# Patient Record
Sex: FEMALE | Race: BLACK OR AFRICAN AMERICAN | URBAN - NONMETROPOLITAN AREA
[De-identification: names, ages, dates, MRNs, and addresses within clinical notes are randomized per-mention and may not be internally consistent; named-entity substitution may affect disease eponyms.]

---

## 2019-10-12 ENCOUNTER — IMPORTED ENCOUNTER (OUTPATIENT)
Dept: URBAN - NONMETROPOLITAN AREA CLINIC 1 | Facility: CLINIC | Age: 39
End: 2019-10-12

## 2019-10-12 PROBLEM — H52.12: Noted: 2019-10-12

## 2019-10-12 PROBLEM — H52.01: Noted: 2019-10-12

## 2019-10-12 PROCEDURE — S0620 ROUTINE OPHTHALMOLOGICAL EXA: HCPCS

## 2019-10-12 NOTE — PATIENT DISCUSSION
"Hyperopia OD Myopia OSDiscussed refractive status in detail with patient. New glasses Rx given today. Continue to monitor. Visual Distortion OSDiscussed diagnosis in detail with patient. No OCT available in satellite location. Patient reports ""bent"" light beam on [facility] Rx test.Refer to [facility] for further evaluation. "

## 2022-04-09 ASSESSMENT — TONOMETRY
OD_IOP_MMHG: 14
OS_IOP_MMHG: 14

## 2022-04-09 ASSESSMENT — VISUAL ACUITY
OD_SC: 20/20
OS_SC: 20/20-